# Patient Record
Sex: FEMALE | Race: BLACK OR AFRICAN AMERICAN | NOT HISPANIC OR LATINO | URBAN - METROPOLITAN AREA
[De-identification: names, ages, dates, MRNs, and addresses within clinical notes are randomized per-mention and may not be internally consistent; named-entity substitution may affect disease eponyms.]

---

## 2020-06-06 ENCOUNTER — EMERGENCY (EMERGENCY)
Facility: HOSPITAL | Age: 25
LOS: 0 days | Discharge: HOME | End: 2020-06-06
Attending: EMERGENCY MEDICINE
Payer: MEDICAID

## 2020-06-06 VITALS
OXYGEN SATURATION: 99 % | TEMPERATURE: 99 F | DIASTOLIC BLOOD PRESSURE: 72 MMHG | HEART RATE: 87 BPM | WEIGHT: 117.07 LBS | HEIGHT: 69 IN | RESPIRATION RATE: 16 BRPM | SYSTOLIC BLOOD PRESSURE: 117 MMHG

## 2020-06-06 DIAGNOSIS — Z88.0 ALLERGY STATUS TO PENICILLIN: ICD-10-CM

## 2020-06-06 DIAGNOSIS — R10.31 RIGHT LOWER QUADRANT PAIN: ICD-10-CM

## 2020-06-06 DIAGNOSIS — Z3A.14 14 WEEKS GESTATION OF PREGNANCY: ICD-10-CM

## 2020-06-06 DIAGNOSIS — O26.892 OTHER SPECIFIED PREGNANCY RELATED CONDITIONS, SECOND TRIMESTER: ICD-10-CM

## 2020-06-06 LAB
ALBUMIN SERPL ELPH-MCNC: 3.3 G/DL — SIGNIFICANT CHANGE UP (ref 3.3–5)
ALP SERPL-CCNC: 29 U/L — LOW (ref 40–120)
ALT FLD-CCNC: 13 U/L — SIGNIFICANT CHANGE UP (ref 12–78)
ANION GAP SERPL CALC-SCNC: 10 MMOL/L — SIGNIFICANT CHANGE UP (ref 5–17)
APPEARANCE UR: CLEAR — SIGNIFICANT CHANGE UP
AST SERPL-CCNC: 9 U/L — LOW (ref 15–37)
BACTERIA # UR AUTO: ABNORMAL
BASOPHILS # BLD AUTO: 0.02 K/UL — SIGNIFICANT CHANGE UP (ref 0–0.2)
BASOPHILS NFR BLD AUTO: 0.2 % — SIGNIFICANT CHANGE UP (ref 0–2)
BILIRUB SERPL-MCNC: 0.3 MG/DL — SIGNIFICANT CHANGE UP (ref 0.2–1.2)
BILIRUB UR-MCNC: NEGATIVE — SIGNIFICANT CHANGE UP
BUN SERPL-MCNC: 8 MG/DL — SIGNIFICANT CHANGE UP (ref 7–23)
CALCIUM SERPL-MCNC: 9.1 MG/DL — SIGNIFICANT CHANGE UP (ref 8.5–10.1)
CHLORIDE SERPL-SCNC: 104 MMOL/L — SIGNIFICANT CHANGE UP (ref 96–108)
CO2 SERPL-SCNC: 23 MMOL/L — SIGNIFICANT CHANGE UP (ref 22–31)
COLOR SPEC: YELLOW — SIGNIFICANT CHANGE UP
COMMENT - URINE: SIGNIFICANT CHANGE UP
CREAT SERPL-MCNC: 0.44 MG/DL — LOW (ref 0.5–1.3)
DIFF PNL FLD: NEGATIVE — SIGNIFICANT CHANGE UP
EOSINOPHIL # BLD AUTO: 0.04 K/UL — SIGNIFICANT CHANGE UP (ref 0–0.5)
EOSINOPHIL NFR BLD AUTO: 0.4 % — SIGNIFICANT CHANGE UP (ref 0–6)
EPI CELLS # UR: SIGNIFICANT CHANGE UP
GLUCOSE SERPL-MCNC: 83 MG/DL — SIGNIFICANT CHANGE UP (ref 70–99)
GLUCOSE UR QL: NEGATIVE MG/DL — SIGNIFICANT CHANGE UP
HCT VFR BLD CALC: 32.5 % — LOW (ref 34.5–45)
HGB BLD-MCNC: 11.2 G/DL — LOW (ref 11.5–15.5)
IMM GRANULOCYTES NFR BLD AUTO: 0.3 % — SIGNIFICANT CHANGE UP (ref 0–1.5)
KETONES UR-MCNC: NEGATIVE — SIGNIFICANT CHANGE UP
LEUKOCYTE ESTERASE UR-ACNC: NEGATIVE — SIGNIFICANT CHANGE UP
LYMPHOCYTES # BLD AUTO: 2.25 K/UL — SIGNIFICANT CHANGE UP (ref 1–3.3)
LYMPHOCYTES # BLD AUTO: 21 % — SIGNIFICANT CHANGE UP (ref 13–44)
MCHC RBC-ENTMCNC: 31.5 PG — SIGNIFICANT CHANGE UP (ref 27–34)
MCHC RBC-ENTMCNC: 34.5 GM/DL — SIGNIFICANT CHANGE UP (ref 32–36)
MCV RBC AUTO: 91.5 FL — SIGNIFICANT CHANGE UP (ref 80–100)
MONOCYTES # BLD AUTO: 1.03 K/UL — HIGH (ref 0–0.9)
MONOCYTES NFR BLD AUTO: 9.6 % — SIGNIFICANT CHANGE UP (ref 2–14)
NEUTROPHILS # BLD AUTO: 7.32 K/UL — SIGNIFICANT CHANGE UP (ref 1.8–7.4)
NEUTROPHILS NFR BLD AUTO: 68.5 % — SIGNIFICANT CHANGE UP (ref 43–77)
NITRITE UR-MCNC: NEGATIVE — SIGNIFICANT CHANGE UP
NRBC # BLD: 0 /100 WBCS — SIGNIFICANT CHANGE UP (ref 0–0)
PH UR: 8 — SIGNIFICANT CHANGE UP (ref 5–8)
PLATELET # BLD AUTO: 279 K/UL — SIGNIFICANT CHANGE UP (ref 150–400)
POTASSIUM SERPL-MCNC: 4.2 MMOL/L — SIGNIFICANT CHANGE UP (ref 3.5–5.3)
POTASSIUM SERPL-SCNC: 4.2 MMOL/L — SIGNIFICANT CHANGE UP (ref 3.5–5.3)
PROT SERPL-MCNC: 7.3 GM/DL — SIGNIFICANT CHANGE UP (ref 6–8.3)
PROT UR-MCNC: NEGATIVE MG/DL — SIGNIFICANT CHANGE UP
RBC # BLD: 3.55 M/UL — LOW (ref 3.8–5.2)
RBC # FLD: 12.8 % — SIGNIFICANT CHANGE UP (ref 10.3–14.5)
RBC CASTS # UR COMP ASSIST: SIGNIFICANT CHANGE UP /HPF (ref 0–4)
SODIUM SERPL-SCNC: 137 MMOL/L — SIGNIFICANT CHANGE UP (ref 135–145)
SP GR SPEC: 1.01 — SIGNIFICANT CHANGE UP (ref 1.01–1.02)
UROBILINOGEN FLD QL: NEGATIVE MG/DL — SIGNIFICANT CHANGE UP
WBC # BLD: 10.69 K/UL — HIGH (ref 3.8–10.5)
WBC # FLD AUTO: 10.69 K/UL — HIGH (ref 3.8–10.5)
WBC UR QL: SIGNIFICANT CHANGE UP

## 2020-06-06 PROCEDURE — 76805 OB US >/= 14 WKS SNGL FETUS: CPT | Mod: 26

## 2020-06-06 PROCEDURE — 74181 MRI ABDOMEN W/O CONTRAST: CPT | Mod: 26

## 2020-06-06 PROCEDURE — 99285 EMERGENCY DEPT VISIT HI MDM: CPT

## 2020-06-06 PROCEDURE — 76770 US EXAM ABDO BACK WALL COMP: CPT | Mod: 26

## 2020-06-06 RX ORDER — ACETAMINOPHEN 500 MG
650 TABLET ORAL ONCE
Refills: 0 | Status: COMPLETED | OUTPATIENT
Start: 2020-06-06 | End: 2020-06-06

## 2020-06-06 RX ORDER — SODIUM CHLORIDE 9 MG/ML
1000 INJECTION, SOLUTION INTRAVENOUS ONCE
Refills: 0 | Status: COMPLETED | OUTPATIENT
Start: 2020-06-06 | End: 2020-06-06

## 2020-06-06 RX ADMIN — SODIUM CHLORIDE 1000 MILLILITER(S): 9 INJECTION, SOLUTION INTRAVENOUS at 15:53

## 2020-06-06 RX ADMIN — Medication 650 MILLIGRAM(S): at 15:53

## 2020-06-06 NOTE — ED ADULT NURSE NOTE - CHIEF COMPLAINT QUOTE
as per pt " acute onset of weakness, headache &  lower right sided abdominal pain that radiates to the pelvic area starting last night. " hx 4 mos pregnant.  pt denies any vaginal bleeding, cramping or urinary symptoms.

## 2020-06-06 NOTE — ED PROVIDER NOTE - PATIENT PORTAL LINK FT
You can access the FollowMyHealth Patient Portal offered by [facility] by registering at the following website: [email or web address]. By joining [employer]’s FollowMyHealth portal, you will also be able to view your health information using other applications (apps) compatible with our system.

## 2020-06-06 NOTE — ED PROVIDER NOTE - PHYSICAL EXAMINATION
PHYSICAL EXAMINATION:  VITALS REVIEWED.  VS normal  GENERALIZED APPEARANCE:  Comfortable, no acute distress, ambulating without difficulty  SKIN:  Warm, dry, no cyanosis  HEAD:  No obvious scalp lesions  EYES:  Conjunctiva pink, no icterus  ENMT:  Mucus membranes moist, no stridor  NECK:  Supple, non-tender  CHEST AND RESPIRATORY:  Clear to auscultation B/L, good air entry B/L, equal chest expansion  HEART AND CARDIOVASCULAR:  Regular rate, no obvious murmur  ABDOMEN AND GI:  Soft, +RLQ tenderness, non-distended.  No rebound, no guarding, +R CVA tenderness   EXTREMITIES:  No deformity, edema, or calf tenderness  NEURO: AAOx3, gross motor and sensory intact

## 2020-06-06 NOTE — ED PROVIDER NOTE - CLINICAL SUMMARY MEDICAL DECISION MAKING FREE TEXT BOX
24F  w/ R flank pain, RLQ pain; r/o stone, r/o torsion, r/o SAB, r/o appendicitis, ?MSK pain; labs, imaging, monitoring, pain management, re-eval

## 2020-06-06 NOTE — ED PROVIDER NOTE - NS ED ROS FT
ROS:  GEN: (-) fevers/chills, (-) weight loss, (-) fatigue/malaise  HEENT: (-) visual change, (-) photophobia, (-) nasal congestion/rhinorrhea, (-) difficulty swallowing  NECK: (-) stiffness, (-) swelling  RESP: (-) shortness of breath, (-) cough, (-) sputum, (-) hemoptysis, (-) BRADLEY  CV: (-) chest pain, (-) palpitations, (-) PND/orthopnea  GI: (+) nausea, (-) vomiting, (+) pain, (-) constipation, (-) diarrhea, (-) melena, (-) BRBPR  : (-) frequency/urgency, (-) hematuria, (-) dysuria, (-) incontinence, (-) discharge  EXT: (-) edema, (-) cyanosis  ENDO: (-) heat/cold intolerance, (-) polyuria  NEURO: (-) paresthesias, (-) weakness, (-) headache, (-) dizziness, (-) syncope  MSK: no recent trauma, no muscle pain   SKIN: (-) rash

## 2020-06-06 NOTE — ED ADULT NURSE NOTE - NS PRO AD BILL OF RIGHTS
It looks like I cannot order a new sleep study without cancelling the existing order but the system does not let me do that. Please see if can cancel the sleep study order that exists so I can enter a new one. No

## 2020-06-06 NOTE — ED PROVIDER NOTE - NSFOLLOWUPINSTRUCTIONS_ED_ALL_ED_FT
Return to the emergency room if you experience worsening symptoms, difficulty breathing, fevers, n/v, chest pain, abd pain, or new concerning symptoms.    follow up with your obgyn doctor in 1-2 days.

## 2020-06-06 NOTE — ED PROVIDER NOTE - PROGRESS NOTE DETAILS
FRANCISCO MCGRATH: MRI negative for appy, discussed w/ pt, to be dc f/u pmd, return precautions given, patient is hemodynamically stable, mentating well, and ambulatory for dc. ABD: soft nd nt gravid uterus.

## 2020-06-06 NOTE — ED ADULT TRIAGE NOTE - CHIEF COMPLAINT QUOTE
as per pt " acute onset of weakness, headache &  lower right sided abdominal pain that radiates to the pelvic area starting last night. " pt denies any vaginal bleeding, cramping or urinary symptoms. as per pt " acute onset of weakness, headache &  lower right sided abdominal pain that radiates to the pelvic area starting last night. " hx 4 mos pregnant.  pt denies any vaginal bleeding, cramping or urinary symptoms.

## 2020-06-06 NOTE — ED PROVIDER NOTE - OBJECTIVE STATEMENT
24F  (EGA 14w3d by LMP ) w/ no PMHx w/ R flank pain radiating to the RLQ started last night, worsening, sharp/achy, exacerbated by movement, started when she rolled over on her water bottle, alleviated with rest and positional changes, denies any prior similar symptoms. Patient states that she has not taken anything for pain. Patient states that she has never had this before. Denies any trauma/injuries.

## 2020-06-07 LAB
CULTURE RESULTS: SIGNIFICANT CHANGE UP
SPECIMEN SOURCE: SIGNIFICANT CHANGE UP

## 2020-08-03 ENCOUNTER — EMERGENCY (EMERGENCY)
Facility: HOSPITAL | Age: 25
LOS: 0 days | Discharge: ANOTHER HOSPITAL | End: 2020-08-03
Attending: EMERGENCY MEDICINE
Payer: MEDICAID

## 2020-08-03 ENCOUNTER — OUTPATIENT (OUTPATIENT)
Dept: INPATIENT UNIT | Facility: HOSPITAL | Age: 25
LOS: 1 days | End: 2020-08-03
Payer: MEDICAID

## 2020-08-03 VITALS
OXYGEN SATURATION: 100 % | TEMPERATURE: 98 F | RESPIRATION RATE: 18 BRPM | SYSTOLIC BLOOD PRESSURE: 123 MMHG | HEART RATE: 85 BPM | DIASTOLIC BLOOD PRESSURE: 78 MMHG

## 2020-08-03 VITALS
HEART RATE: 93 BPM | SYSTOLIC BLOOD PRESSURE: 109 MMHG | RESPIRATION RATE: 16 BRPM | TEMPERATURE: 99 F | DIASTOLIC BLOOD PRESSURE: 72 MMHG

## 2020-08-03 VITALS
HEART RATE: 103 BPM | OXYGEN SATURATION: 100 % | DIASTOLIC BLOOD PRESSURE: 76 MMHG | RESPIRATION RATE: 21 BRPM | WEIGHT: 126.99 LBS | HEIGHT: 69 IN | TEMPERATURE: 99 F | SYSTOLIC BLOOD PRESSURE: 116 MMHG

## 2020-08-03 VITALS — DIASTOLIC BLOOD PRESSURE: 69 MMHG | SYSTOLIC BLOOD PRESSURE: 106 MMHG | HEART RATE: 88 BPM

## 2020-08-03 DIAGNOSIS — O46.92 ANTEPARTUM HEMORRHAGE, UNSPECIFIED, SECOND TRIMESTER: ICD-10-CM

## 2020-08-03 DIAGNOSIS — O26.899 OTHER SPECIFIED PREGNANCY RELATED CONDITIONS, UNSPECIFIED TRIMESTER: ICD-10-CM

## 2020-08-03 DIAGNOSIS — O60.00 PRETERM LABOR WITHOUT DELIVERY, UNSPECIFIED TRIMESTER: ICD-10-CM

## 2020-08-03 DIAGNOSIS — Z88.0 ALLERGY STATUS TO PENICILLIN: ICD-10-CM

## 2020-08-03 DIAGNOSIS — N93.9 ABNORMAL UTERINE AND VAGINAL BLEEDING, UNSPECIFIED: ICD-10-CM

## 2020-08-03 DIAGNOSIS — Z3A.00 WEEKS OF GESTATION OF PREGNANCY NOT SPECIFIED: ICD-10-CM

## 2020-08-03 LAB
ALBUMIN SERPL ELPH-MCNC: 2.9 G/DL — LOW (ref 3.3–5)
ALP SERPL-CCNC: 41 U/L — SIGNIFICANT CHANGE UP (ref 40–120)
ALT FLD-CCNC: 23 U/L — SIGNIFICANT CHANGE UP (ref 12–78)
ANION GAP SERPL CALC-SCNC: 6 MMOL/L — SIGNIFICANT CHANGE UP (ref 5–17)
APPEARANCE UR: CLEAR — SIGNIFICANT CHANGE UP
AST SERPL-CCNC: 14 U/L — LOW (ref 15–37)
BACTERIA # UR AUTO: ABNORMAL
BASOPHILS # BLD AUTO: 0.02 K/UL — SIGNIFICANT CHANGE UP (ref 0–0.2)
BASOPHILS NFR BLD AUTO: 0.2 % — SIGNIFICANT CHANGE UP (ref 0–2)
BILIRUB SERPL-MCNC: 0.3 MG/DL — SIGNIFICANT CHANGE UP (ref 0.2–1.2)
BILIRUB UR-MCNC: NEGATIVE — SIGNIFICANT CHANGE UP
BLD GP AB SCN SERPL QL: SIGNIFICANT CHANGE UP
BUN SERPL-MCNC: 9 MG/DL — SIGNIFICANT CHANGE UP (ref 7–23)
CALCIUM SERPL-MCNC: 8.2 MG/DL — LOW (ref 8.5–10.1)
CHLORIDE SERPL-SCNC: 106 MMOL/L — SIGNIFICANT CHANGE UP (ref 96–108)
CO2 SERPL-SCNC: 23 MMOL/L — SIGNIFICANT CHANGE UP (ref 22–31)
COLOR SPEC: YELLOW — SIGNIFICANT CHANGE UP
CREAT SERPL-MCNC: 0.59 MG/DL — SIGNIFICANT CHANGE UP (ref 0.5–1.3)
DIFF PNL FLD: NEGATIVE — SIGNIFICANT CHANGE UP
EOSINOPHIL # BLD AUTO: 0.09 K/UL — SIGNIFICANT CHANGE UP (ref 0–0.5)
EOSINOPHIL NFR BLD AUTO: 0.7 % — SIGNIFICANT CHANGE UP (ref 0–6)
GLUCOSE SERPL-MCNC: 75 MG/DL — SIGNIFICANT CHANGE UP (ref 70–99)
GLUCOSE UR QL: NEGATIVE MG/DL — SIGNIFICANT CHANGE UP
HCG SERPL-ACNC: HIGH MIU/ML
HCT VFR BLD CALC: 31 % — LOW (ref 34.5–45)
HGB BLD-MCNC: 10.6 G/DL — LOW (ref 11.5–15.5)
IMM GRANULOCYTES NFR BLD AUTO: 0.3 % — SIGNIFICANT CHANGE UP (ref 0–1.5)
KETONES UR-MCNC: NEGATIVE — SIGNIFICANT CHANGE UP
LEUKOCYTE ESTERASE UR-ACNC: ABNORMAL
LYMPHOCYTES # BLD AUTO: 19.8 % — SIGNIFICANT CHANGE UP (ref 13–44)
LYMPHOCYTES # BLD AUTO: 2.41 K/UL — SIGNIFICANT CHANGE UP (ref 1–3.3)
MAGNESIUM SERPL-MCNC: 2 MG/DL — SIGNIFICANT CHANGE UP (ref 1.6–2.6)
MCHC RBC-ENTMCNC: 32 PG — SIGNIFICANT CHANGE UP (ref 27–34)
MCHC RBC-ENTMCNC: 34.2 GM/DL — SIGNIFICANT CHANGE UP (ref 32–36)
MCV RBC AUTO: 93.7 FL — SIGNIFICANT CHANGE UP (ref 80–100)
MONOCYTES # BLD AUTO: 1.15 K/UL — HIGH (ref 0–0.9)
MONOCYTES NFR BLD AUTO: 9.5 % — SIGNIFICANT CHANGE UP (ref 2–14)
NEUTROPHILS # BLD AUTO: 8.45 K/UL — HIGH (ref 1.8–7.4)
NEUTROPHILS NFR BLD AUTO: 69.5 % — SIGNIFICANT CHANGE UP (ref 43–77)
NITRITE UR-MCNC: NEGATIVE — SIGNIFICANT CHANGE UP
NRBC # BLD: 0 /100 WBCS — SIGNIFICANT CHANGE UP (ref 0–0)
PH UR: 6.5 — SIGNIFICANT CHANGE UP (ref 5–8)
PLATELET # BLD AUTO: 273 K/UL — SIGNIFICANT CHANGE UP (ref 150–400)
POTASSIUM SERPL-MCNC: 3.7 MMOL/L — SIGNIFICANT CHANGE UP (ref 3.5–5.3)
POTASSIUM SERPL-SCNC: 3.7 MMOL/L — SIGNIFICANT CHANGE UP (ref 3.5–5.3)
PROT SERPL-MCNC: 6.9 GM/DL — SIGNIFICANT CHANGE UP (ref 6–8.3)
PROT UR-MCNC: NEGATIVE MG/DL — SIGNIFICANT CHANGE UP
RBC # BLD: 3.31 M/UL — LOW (ref 3.8–5.2)
RBC # FLD: 13.4 % — SIGNIFICANT CHANGE UP (ref 10.3–14.5)
RBC CASTS # UR COMP ASSIST: SIGNIFICANT CHANGE UP /HPF (ref 0–4)
SODIUM SERPL-SCNC: 135 MMOL/L — SIGNIFICANT CHANGE UP (ref 135–145)
SP GR SPEC: 1.01 — SIGNIFICANT CHANGE UP (ref 1.01–1.02)
UROBILINOGEN FLD QL: NEGATIVE MG/DL — SIGNIFICANT CHANGE UP
WBC # BLD: 12.16 K/UL — HIGH (ref 3.8–10.5)
WBC # FLD AUTO: 12.16 K/UL — HIGH (ref 3.8–10.5)
WBC UR QL: SIGNIFICANT CHANGE UP

## 2020-08-03 PROCEDURE — 76856 US EXAM PELVIC COMPLETE: CPT | Mod: 26

## 2020-08-03 PROCEDURE — 99285 EMERGENCY DEPT VISIT HI MDM: CPT

## 2020-08-03 PROCEDURE — 99203 OFFICE O/P NEW LOW 30 MIN: CPT

## 2020-08-03 PROCEDURE — 76830 TRANSVAGINAL US NON-OB: CPT | Mod: 26

## 2020-08-03 PROCEDURE — 76818 FETAL BIOPHYS PROFILE W/NST: CPT | Mod: 26

## 2020-08-03 RX ORDER — SODIUM CHLORIDE 9 MG/ML
1000 INJECTION INTRAMUSCULAR; INTRAVENOUS; SUBCUTANEOUS ONCE
Refills: 0 | Status: COMPLETED | OUTPATIENT
Start: 2020-08-03 | End: 2020-08-03

## 2020-08-03 RX ADMIN — SODIUM CHLORIDE 1000 MILLILITER(S): 9 INJECTION INTRAMUSCULAR; INTRAVENOUS; SUBCUTANEOUS at 17:16

## 2020-08-03 RX ADMIN — SODIUM CHLORIDE 1000 MILLILITER(S): 9 INJECTION INTRAMUSCULAR; INTRAVENOUS; SUBCUTANEOUS at 19:19

## 2020-08-03 NOTE — OB PROVIDER TRIAGE NOTE - NSOBPROVIDERNOTE_OBGYN_ALL_OB_FT
No evidence of vaginal bleeding. No evidence of pre-term labor. Discussed with Dr. Manley/Dr. Kearney:  -Patient cleared for discharge home  -PLT precautions reviewed  -Patient instructed to follow up with next scheduled appointment 8/8/2020  -Verbal and written discharge instructions given , patient verbalized understanding

## 2020-08-03 NOTE — OB PROVIDER TRIAGE NOTE - HISTORY OF PRESENT ILLNESS
Default patient 26 y/o  @22.3 weeks gestation presents to triage as a transfer from [place]. Patient states she presented to [place] with complaints of very light vaginal spotting, pink tinged and cramping x1 hour. Patient states she was told she was diagnosed with placenta previa 3 weeks ago during her anatomy scan.  Patient denies leaking of fluid and reports positive fetal movement. Patient receives prenatal care with Dr. Krys Clark   Current Ap course significant for: Placenta Previa  Surgical H/x: Denies  Ob/Gyn H/x: Denies  Psych H/x: Denies  Meds: Pnv  NKDA Default patient 26 y/o  @22.3 weeks gestation presents to triage as a transfer from [place]. Patient states she presented to [place] with complaints of very light vaginal spotting, pink tinged and cramping x1 hour. Patient states she was told she was diagnosed with placenta previa 3 weeks ago during her anatomy scan.  Patient denies leaking of fluid and reports positive fetal movement. Patient receives prenatal care with Dr. Krys Clark.   Current Ap course significant for: Placenta Previa  Surgical H/x: Denies  Ob/Gyn H/x: Denies  Psych H/x: Denies  Meds: Pnv  NKDA

## 2020-08-03 NOTE — ED ADULT NURSE NOTE - OBJECTIVE STATEMENT
Pt walked in c/o abdominal cramp and spotting 21 weeks pregnancy denies any medical hx. light spotting beginign this am 1 clot. pt was told to come in by obgyn. first pregancy

## 2020-08-03 NOTE — ED PROVIDER NOTE - CLINICAL SUMMARY MEDICAL DECISION MAKING FREE TEXT BOX
vaginal bleeding. hx of low lying placenta vaginal bleeding. hx of low lying placenta. ob is darwin jhaveri vaginal bleeding. hx of low lying placenta. ob is darwin jhaveri. case dw dr melgar at [facility]. will xfer for further eval.  RH pending...

## 2020-08-03 NOTE — ED PROVIDER NOTE - PHYSICAL EXAMINATION
Gen: Alert, NAD  Head: NC, AT   Eyes: PERRL, EOMI, normal lids/conjunctiva  ENT: normal hearing, patent oropharynx without erythema/exudate, uvula midline  Neck: supple, no tenderness, Trachea midline  Pulm: Bilateral BS, normal resp effort, no wheeze/stridor/retractions  CV: RRR, no M/R/G, 2+ radial and dp pulses bl, no edema  Abd: gravid, non tender   Mskel: extremities x4 with normal ROM and no joint effusions. no ctl spine ttp.   Skin: no rash, no bruising   Neuro: AAOx3, no sensory/motor deficits, CN 2-12 intact

## 2020-08-03 NOTE — ED PROVIDER NOTE - OBJECTIVE STATEMENT
25F hx g1 at 21 weeks pw vaginal spotting x2 days. was told by obgyn to present to er if she has bleeding because she is known to have placenta previa. she also notes regular lower abdominal pain into the pelvic and vagina. no vaginal discharge. no nausea, vomiting, fever, chills, rash. ros neg for ha, vision loss, rhinorrhea, cp, sob, rash, numbness

## 2020-08-03 NOTE — OB PROVIDER TRIAGE NOTE - NSHPPHYSICALEXAM_GEN_ALL_CORE
Bp: 109/72  Hr: 93  T: 37.3  Abdomen: Gravid, soft, non-tender on palpation   TAUS: Anterior placenta, Low lying, MVP: 6.30cm. FHR: 139bpm  SSE: No blood in vault, cervix appears closed  TVUS Bp: 109/72  Hr: 93  T: 37.3  Abdomen: Gravid, soft, non-tender on palpation   TAUS: Anterior placenta, Low lying, MVP: 6.30cm. FHR: 139bpm  SSE: No blood in vault, cervix appears closed  TVUS: 3.28-3.49cm, no funneling, no dynamic changes  [place]:  No contractions on toco

## 2020-08-03 NOTE — OB PROVIDER TRIAGE NOTE - ADDITIONAL INSTRUCTIONS
-Patient cleared for discharge home  -PLT precautions reviewed  -Patient instructed to follow up with next scheduled appointment 8/8/2020  -Verbal and written discharge instructions given , patient verbalized understanding

## 2020-08-03 NOTE — OB PROVIDER TRIAGE NOTE - NSHPLABSRESULTS_GEN_ALL_CORE
Blood Type: O Positive  Urinalysis Basic - ( 03 Aug 2020 17:52 )    Color: Yellow / Appearance: Clear / S.010 / pH: x  Gluc: x / Ketone: Negative  / Bili: Negative / Urobili: Negative mg/dL   Blood: x / Protein: Negative mg/dL / Nitrite: Negative   Leuk Esterase: Trace / RBC: 0-2 /HPF / WBC 0-2   Sq Epi: x / Non Sq Epi: x / Bacteria: Occasional

## 2020-08-03 NOTE — ED ADULT NURSE NOTE - ED STAT RN HANDOFF DETAILS
report taken from GEORGE hammer. no acute distress noted, will continue to monitor. pt denies pain, denies SOB, denies n/v/d, denies chest pain, denies vaginal bleeding at this time. no complaints at this time. pending transfer for further eval. report taken from GEORGE hammer. no acute distress noted, will continue to monitor. pt denies pain, denies SOB, denies n/v/d, denies chest pain, denies vaginal bleeding at this time. no complaints at this time. covid sent. pending transfer for further eval.

## 2020-08-03 NOTE — ED ADULT NURSE NOTE - PAIN RATING/NUMBER SCALE (0-10): ACTIVITY
Writer called Richy today and see how he was doing s/p liver lesion biopsy and discuss scheduled consult with Dr. Fink to be aligned with biopsy results. Discussed with Watson keeping 1/9 appointment at this time however if biopsy results are available sooner than we expect (writer will monitor) we can call and attempt to have him be seen sooner. Watson verbalized understanding and states if he runs out of pain medication, he will all PCP for refill. Encouraged Watson to all if any additional needs arise. Support and encouragement provided.    4

## 2020-08-04 PROBLEM — Z78.9 OTHER SPECIFIED HEALTH STATUS: Chronic | Status: ACTIVE | Noted: 2020-06-06

## 2020-08-04 LAB — SARS-COV-2 RNA SPEC QL NAA+PROBE: SIGNIFICANT CHANGE UP

## 2020-09-04 ENCOUNTER — EMERGENCY (EMERGENCY)
Facility: HOSPITAL | Age: 25
LOS: 1 days | Discharge: STILL ADMITTED OR ADMINISTRATIVE | End: 2020-09-04
Admitting: EMERGENCY MEDICINE
Payer: MEDICAID

## 2020-09-04 ENCOUNTER — OUTPATIENT (OUTPATIENT)
Dept: INPATIENT UNIT | Facility: HOSPITAL | Age: 25
LOS: 1 days | Discharge: HOME | End: 2020-09-04
Payer: MEDICAID

## 2020-09-04 VITALS — HEART RATE: 82 BPM | SYSTOLIC BLOOD PRESSURE: 94 MMHG | DIASTOLIC BLOOD PRESSURE: 52 MMHG

## 2020-09-04 VITALS — DIASTOLIC BLOOD PRESSURE: 70 MMHG | SYSTOLIC BLOOD PRESSURE: 113 MMHG | HEART RATE: 111 BPM

## 2020-09-04 DIAGNOSIS — O26.899 OTHER SPECIFIED PREGNANCY RELATED CONDITIONS, UNSPECIFIED TRIMESTER: ICD-10-CM

## 2020-09-04 DIAGNOSIS — Z3A.00 WEEKS OF GESTATION OF PREGNANCY NOT SPECIFIED: ICD-10-CM

## 2020-09-04 LAB
APPEARANCE UR: SIGNIFICANT CHANGE UP
BACTERIA # UR AUTO: HIGH
BILIRUB UR-MCNC: NEGATIVE — SIGNIFICANT CHANGE UP
BLOOD UR QL VISUAL: NEGATIVE — SIGNIFICANT CHANGE UP
COLOR SPEC: COLORLESS — SIGNIFICANT CHANGE UP
GLUCOSE UR-MCNC: NEGATIVE — SIGNIFICANT CHANGE UP
KETONES UR-MCNC: NEGATIVE — SIGNIFICANT CHANGE UP
LEUKOCYTE ESTERASE UR-ACNC: SIGNIFICANT CHANGE UP
NITRITE UR-MCNC: NEGATIVE — SIGNIFICANT CHANGE UP
PH UR: 7 — SIGNIFICANT CHANGE UP (ref 5–8)
PROT UR-MCNC: NEGATIVE — SIGNIFICANT CHANGE UP
RBC CASTS # UR COMP ASSIST: SIGNIFICANT CHANGE UP (ref 0–?)
SP GR SPEC: 1.01 — SIGNIFICANT CHANGE UP (ref 1–1.04)
SQUAMOUS # UR AUTO: SIGNIFICANT CHANGE UP
UROBILINOGEN FLD QL: NORMAL — SIGNIFICANT CHANGE UP
WBC UR QL: HIGH (ref 0–?)

## 2020-09-04 PROCEDURE — 76816 OB US FOLLOW-UP PER FETUS: CPT | Mod: 26

## 2020-09-04 PROCEDURE — 76817 TRANSVAGINAL US OBSTETRIC: CPT | Mod: 26

## 2020-09-04 PROCEDURE — 99214 OFFICE O/P EST MOD 30 MIN: CPT | Mod: 25

## 2020-09-04 PROCEDURE — 99282 EMERGENCY DEPT VISIT SF MDM: CPT

## 2020-09-04 PROCEDURE — 59025 FETAL NON-STRESS TEST: CPT | Mod: 26

## 2020-09-04 NOTE — OB PROVIDER TRIAGE NOTE - NSHPPHYSICALEXAM_GEN_ALL_CORE
speculum exam - negative pool/dye/fern  TVUS - CL3.5-3.7cm, no funneling or dynamic changes noted  TAS - cephalic presentation, anterior placenta, mvp 4.3cm, bpp 8/8, efw 1088 grams  abdomen soft and nontender    Vital Signs Last 24 Hrs  T(C): 37 (04 Sep 2020 13:58), Max: 37 (04 Sep 2020 13:58)  T(F): 98.6 (04 Sep 2020 13:58), Max: 98.6 (04 Sep 2020 13:58)  HR: 108 (04 Sep 2020 14:03) (102 - 111)  BP: 113/70 (04 Sep 2020 13:58) (113/70 - 113/70)  BP(mean): --  RR: 16 (04 Sep 2020 13:58) (16 - 16)  SpO2: 100% (04 Sep 2020 14:03) (100% - 100%) speculum exam - negative pool/dye/fern  TVUS - CL3.5-3.7cm, no funneling or dynamic changes noted  TAS - cephalic presentation, anterior placenta, mvp 4.3cm, bpp 8/8, efw 1088 grams  abdomen soft and nontender    Vital Signs Last 24 Hrs  T(C): 37 (04 Sep 2020 13:58), Max: 37 (04 Sep 2020 13:58)  T(F): 98.6 (04 Sep 2020 13:58), Max: 98.6 (04 Sep 2020 13:58)  HR: 108 (04 Sep 2020 14:03) (102 - 111)  BP: 113/70 (04 Sep 2020 13:58) (113/70 - 113/70)  BP(mean): --  RR: 16 (04 Sep 2020 13:58) (16 - 16)  SpO2: 100% (04 Sep 2020 14:03) (100% - 100%)    Cat 1 fhr tracing, fhr 145 with mod variability, accels, no decels  acontractile on toco

## 2020-09-04 NOTE — ED ADULT TRIAGE NOTE - CHIEF COMPLAINT QUOTE
Pt brought in by ambulance as a notification, for imminent childbirth as per EMS pt is 27 wks w/ contractions every 1.5min and "her water broke." Taken to trauma B.

## 2020-09-04 NOTE — ED PROVIDER NOTE - CLINICAL SUMMARY MEDICAL DECISION MAKING FREE TEXT BOX
24 y/o G1PO F @27 weeks gestation (single baby) with no PMH presenting due to concern for labor and imminent deliver. 45 minutes ago with gush of fluid and contractions ~1-1.5 min. Vital signs stable. No bleeding or fetal parts palpated on pelvic exam. Will bring patient to L&D triage for further evaluation and management. Case discussed w/ OB resident on the way to L&D.

## 2020-09-04 NOTE — ED ADULT NURSE NOTE - OBJECTIVE STATEMENT
pt BIBEMS first pregnancy 27 weeks felt a "rush" of fluid earlier this AM and is now experiencing contractions every 1.5 minutes. pt was examined in Tr B by Dr Steel and cleared to go to L&D. Dr Ca spoke with OBMARALN. pt sent to L&D.

## 2020-09-04 NOTE — ED PROVIDER NOTE - OBJECTIVE STATEMENT
24 y/o G1PO F @27 weeks gestation with no PMH, OB hx of low lying placenta but "moved up" on repeat US, no other pregnancy complications noted, presenting due to concern for labor and imminent delivery. Pt reports 45 minutes ago had gush of clear fluid with pelvic pain/contractions every ~1-1.5 minutes. No bleeding. 1 baby in uterus per mom. No fevers/chills, chest pain, SOB, n/v, leg swelling, HA, vision changes, or other complaints at this time.

## 2020-09-04 NOTE — OB PROVIDER TRIAGE NOTE - HISTORY OF PRESENT ILLNESS
27 y.o.  @ 27 weeks reports lof @ 12pm and shooting pain in her lower abdomen into her vaginal/rectal area. Pt reports good fetal movement. Denies vb. Pt sees Krys Nicolas at [place] for prenatal care.

## 2020-09-04 NOTE — ED PROVIDER NOTE - ATTENDING CONTRIBUTION TO CARE
Dr. Steel: 24 yo female approx 27 weeks pregnant with first child, in ED with cramping abdominal pain beginning 45 minutes after gush of fluid from vagina, with concern for possible labor.  Pt evaluated in ED, no signed of imminent delivery and so pt stable for transfer to L&D.

## 2020-09-04 NOTE — OB PROVIDER TRIAGE NOTE - NSHPLABSRESULTS_GEN_ALL_CORE
Urinalysis Basic - ( 04 Sep 2020 14:11 )    Color: COLORLESS / Appearance: Lt TURBID / S.006 / pH: 7.0  Gluc: NEGATIVE / Ketone: NEGATIVE  / Bili: NEGATIVE / Urobili: NORMAL   Blood: NEGATIVE / Protein: NEGATIVE / Nitrite: NEGATIVE   Leuk Esterase: SMALL / RBC: x / WBC x   Sq Epi: x / Non Sq Epi: x / Bacteria: x

## 2020-09-04 NOTE — OB PROVIDER TRIAGE NOTE - NSOBPROVIDERNOTE_OBGYN_ALL_OB_FT
UA pending UA pending    d/w Dr. David  pt discharged home - no evidence of pprom or ptl  ptl precautions reviewed  f/u with next scheduled appointment  daily kick counts reviewed

## 2020-09-04 NOTE — ED PROVIDER NOTE - PHYSICAL EXAMINATION
PHYSICAL EXAM:  GENERAL: Alert, appears uncomfortable 2/2 contractions but in no acute distress  HENMT: Atraumatic, moist mucous membranes  EYES: Clear bilaterally, PERRL, EOMs intact b/l  HEART: RRR, S1/S2, no murmurs noted  RESPIRATORY: Clear to auscultation bilaterally, no wheezes/rhonchi/rales  ABDOMEN: gravid abdomen, +BS, soft, nontender  : Small amount of mucous present at vaginal introitus, no bleeding, unable to palpate cervix, no fetal head or fetal parts palpated in vaginal canal  EXTREMITIES: No lower extremity edema, +2 radial pulses b/l  NEURO:  A&Ox4, no focal motor deficits or sensory deficits   SKIN:  Skin normal color for race, warm, dry and intact. No evidence of rash. PHYSICAL EXAM:  GENERAL: Alert, appears uncomfortable 2/2 ?contractions but in no acute distress  HENMT: Atraumatic, moist mucous membranes  ABDOMEN: gravid abdomen, +BS, soft, nontender  : Small amount of mucous present at vaginal introitus, no bleeding, unable to palpate cervix, no fetal head or fetal parts palpated in vaginal canal  EXTREMITIES: No lower extremity edema, +2 radial pulses b/l

## 2020-10-17 ENCOUNTER — OUTPATIENT (OUTPATIENT)
Dept: INPATIENT UNIT | Facility: HOSPITAL | Age: 25
LOS: 1 days | Discharge: HOME | End: 2020-10-17
Payer: MEDICAID

## 2020-10-17 VITALS
HEART RATE: 100 BPM | SYSTOLIC BLOOD PRESSURE: 114 MMHG | TEMPERATURE: 98 F | DIASTOLIC BLOOD PRESSURE: 68 MMHG | RESPIRATION RATE: 16 BRPM

## 2020-10-17 VITALS — SYSTOLIC BLOOD PRESSURE: 114 MMHG | DIASTOLIC BLOOD PRESSURE: 68 MMHG | HEART RATE: 100 BPM

## 2020-10-17 DIAGNOSIS — O26.899 OTHER SPECIFIED PREGNANCY RELATED CONDITIONS, UNSPECIFIED TRIMESTER: ICD-10-CM

## 2020-10-17 DIAGNOSIS — Z3A.00 WEEKS OF GESTATION OF PREGNANCY NOT SPECIFIED: ICD-10-CM

## 2020-10-17 LAB
APPEARANCE UR: SIGNIFICANT CHANGE UP
BACTERIA # UR AUTO: HIGH
BILIRUB UR-MCNC: NEGATIVE — SIGNIFICANT CHANGE UP
BLOOD UR QL VISUAL: NEGATIVE — SIGNIFICANT CHANGE UP
COLOR SPEC: SIGNIFICANT CHANGE UP
GLUCOSE UR-MCNC: NEGATIVE — SIGNIFICANT CHANGE UP
HYALINE CASTS # UR AUTO: SIGNIFICANT CHANGE UP
KETONES UR-MCNC: NEGATIVE — SIGNIFICANT CHANGE UP
LEUKOCYTE ESTERASE UR-ACNC: SIGNIFICANT CHANGE UP
NITRITE UR-MCNC: NEGATIVE — SIGNIFICANT CHANGE UP
PH UR: 7.5 — SIGNIFICANT CHANGE UP (ref 5–8)
PROT UR-MCNC: 20 — SIGNIFICANT CHANGE UP
RBC CASTS # UR COMP ASSIST: SIGNIFICANT CHANGE UP (ref 0–?)
SP GR SPEC: 1.01 — SIGNIFICANT CHANGE UP (ref 1–1.04)
SQUAMOUS # UR AUTO: SIGNIFICANT CHANGE UP
UROBILINOGEN FLD QL: NORMAL — SIGNIFICANT CHANGE UP
WBC UR QL: >50 — HIGH (ref 0–?)

## 2020-10-17 PROCEDURE — 99215 OFFICE O/P EST HI 40 MIN: CPT | Mod: 25

## 2020-10-17 PROCEDURE — 76818 FETAL BIOPHYS PROFILE W/NST: CPT | Mod: 26

## 2020-10-17 PROCEDURE — 76817 TRANSVAGINAL US OBSTETRIC: CPT | Mod: 26

## 2020-10-17 RX ORDER — NITROFURANTOIN MACROCRYSTAL 50 MG
1 CAPSULE ORAL
Qty: 14 | Refills: 0
Start: 2020-10-17 | End: 2020-10-23

## 2020-10-17 NOTE — OB PROVIDER TRIAGE NOTE - NSHPPHYSICALEXAM_GEN_ALL_CORE
Gen: A&O x 3; NAD  Vitals: BP-114/68; P-100; P-100; T-36.8    Pulm-CTA B/L; no wheezes  Cor-clear S1S2; no murmurs  Abd exam-soft and nontender; CVA tenderness is neg    SSE-os appears closed. GBS and FFN collected  VE-0.5/60/-2    TVS-1.2-1.57cm; no funnel/dynamic change    NST cat I with accels and mod variability; no ctx's    TAS-vtx Gen: A&O x 3; NAD  Vitals: BP-114/68; P-100; P-100; T-36.8    Pulm-CTA B/L; no wheezes  Cor-clear S1S2; no murmurs  Abd exam-soft and nontender; CVA tenderness is neg    SSE-os appears closed. GBS and FFN collected  VE-0.5/60/-2    TVS-1.2-1.57cm; no funnel/dynamic change    NST cat I with accels and mod variability; no ctx's    TAS-vtx; anterior placeta; THO-14.28; 1959; 8/8 BPP

## 2020-10-17 NOTE — OB RN TRIAGE NOTE - CHIEF COMPLAINT QUOTE
pain x24hrs in lower back and painful urination pain x24hrs in lower back radiating to lower abdomen and painful urination

## 2020-10-17 NOTE — OB PROVIDER TRIAGE NOTE - NSOBPROVIDERNOTE_OBGYN_ALL_OB_FT
26yo -American female  @ 33.3 wks SLIUP uncomp PNC here complaining of lower back pain that radiates around to the front groin and down the front of her thighs Q20 min since last night  -TVS @ 1.2-1.5cm, no ctx's on toco  -pt was dw Dr Gutpa; no FFN needed to be sent since no ctx's on toco  -pt with reassuring maternal-fetal status; awaiting UA  -pt was trent Gupta  -pt reports follow-up appt on Tue 10/20 24yo -American female  @ 33.3 wks SLIUP uncomp PNC here complaining of lower back pain that radiates around to the front groin and down the front of her thighs Q20 min since last night  -TVS @ 1.2-1.5cm, no ctx's on toco  -pt was dw Dr Gupta; no FFN needed to be sent since no ctx's on toco  -pt with reassuring maternal-fetal status; awaiting UA  -pt was trent Gupta. Pt with probable UTI. Pt was dc home with a rx for macrobid 100mg BID. Ucx sent  -pt reports follow-up appt on Tue 10/20

## 2020-10-17 NOTE — OB PROVIDER TRIAGE NOTE - HISTORY OF PRESENT ILLNESS
24yo  female  @ 33.3 wks SLIUP uncomp PNC here complaining of lower back pain that radiates to the front into the groin and down the front of the thighs Q20 min. Pt reports a one time trickle of fluid just now when she was registering in. Pt reports GFM and denies VB. No urinary sx's. Pt reports she gets PNC with the [facility] in [place].     Pmhx-denies  Pshx/Hosp-denies  Meds-PNV  Allergies-PCN->hives  Past ob-denies  Gyn-denies

## 2020-10-18 LAB
CULTURE RESULTS: SIGNIFICANT CHANGE UP
SPECIMEN SOURCE: SIGNIFICANT CHANGE UP

## 2020-10-28 ENCOUNTER — OUTPATIENT (OUTPATIENT)
Dept: INPATIENT UNIT | Facility: HOSPITAL | Age: 25
LOS: 1 days | Discharge: HOME | End: 2020-10-28

## 2020-10-28 VITALS
SYSTOLIC BLOOD PRESSURE: 125 MMHG | HEART RATE: 102 BPM | DIASTOLIC BLOOD PRESSURE: 71 MMHG | TEMPERATURE: 99 F | RESPIRATION RATE: 16 BRPM

## 2020-10-28 VITALS — SYSTOLIC BLOOD PRESSURE: 106 MMHG | DIASTOLIC BLOOD PRESSURE: 61 MMHG | HEART RATE: 100 BPM

## 2020-10-28 DIAGNOSIS — O26.899 OTHER SPECIFIED PREGNANCY RELATED CONDITIONS, UNSPECIFIED TRIMESTER: ICD-10-CM

## 2020-10-28 DIAGNOSIS — Z3A.00 WEEKS OF GESTATION OF PREGNANCY NOT SPECIFIED: ICD-10-CM

## 2020-10-28 LAB
APPEARANCE UR: SIGNIFICANT CHANGE UP
BACTERIA # UR AUTO: HIGH
BILIRUB UR-MCNC: NEGATIVE — SIGNIFICANT CHANGE UP
BLOOD UR QL VISUAL: NEGATIVE — SIGNIFICANT CHANGE UP
COLOR SPEC: SIGNIFICANT CHANGE UP
GLUCOSE UR-MCNC: NEGATIVE — SIGNIFICANT CHANGE UP
HYALINE CASTS # UR AUTO: SIGNIFICANT CHANGE UP
KETONES UR-MCNC: SIGNIFICANT CHANGE UP
LEUKOCYTE ESTERASE UR-ACNC: SIGNIFICANT CHANGE UP
MUCOUS THREADS # UR AUTO: SIGNIFICANT CHANGE UP
NITRITE UR-MCNC: NEGATIVE — SIGNIFICANT CHANGE UP
PH UR: 7 — SIGNIFICANT CHANGE UP (ref 5–8)
PROT UR-MCNC: NEGATIVE — SIGNIFICANT CHANGE UP
RBC CASTS # UR COMP ASSIST: SIGNIFICANT CHANGE UP (ref 0–?)
SP GR SPEC: 1.01 — SIGNIFICANT CHANGE UP (ref 1–1.04)
SQUAMOUS # UR AUTO: SIGNIFICANT CHANGE UP
UROBILINOGEN FLD QL: NORMAL — SIGNIFICANT CHANGE UP
WBC UR QL: HIGH (ref 0–?)

## 2020-10-28 RX ORDER — NITROFURANTOIN MACROCRYSTAL 50 MG
1 CAPSULE ORAL
Qty: 14 | Refills: 0
Start: 2020-10-28 | End: 2020-11-03

## 2020-10-28 RX ORDER — ACETAMINOPHEN 500 MG
975 TABLET ORAL ONCE
Refills: 0 | Status: COMPLETED | OUTPATIENT
Start: 2020-10-28 | End: 2020-10-28

## 2020-10-28 RX ADMIN — Medication 975 MILLIGRAM(S): at 07:20

## 2020-10-28 NOTE — OB RN TRIAGE NOTE - SUICIDE SCREENING QUESTION 1
Patient has a two week history of abdominal pain, loose stools, sharp hooting pain right side of lower abdomen. The pain is worsening. She will also have blood in the stool. She has a hx of IBS. she is under the care of GI. She also has indigestion.  She wa
No

## 2020-10-28 NOTE — OB PROVIDER TRIAGE NOTE - NSHPPHYSICALEXAM_GEN_ALL_CORE
Assessment reveals VSS, abdomen soft, NT, gravid.   No CVA tenderness  BPP 8/8, THO 14.8, EFW 2226g, vtx, anterior placenta appears intact.  SSE- no VB in vaginal vault. Cervix appears closed  VE 1/80/-3    UA sent   GBS culture sent Assessment reveals VSS, abdomen soft, NT, gravid.   No CVA tenderness  BPP 8/8, THO 14.8, EFW 2226g, vtx, anterior placenta appears intact.  SSE- no VB in vaginal vault. Cervix appears closed  VE 1/80/-3    UA sent   GBS culture sent  Tylenol 975mg PO given for back pain Assessment reveals VSS, abdomen soft, NT, gravid.   No CVA tenderness  BPP 8/8, THO 14.8, EFW 2226g, vtx, anterior placenta appears intact.  SSE- no VB in vaginal vault. Cervix appears closed  VE 1/80/-3    UA sent   GBS culture sent  Tylenol 975mg PO given for back pain    0940: Patient reports feeling leaking of fluid from 20 minutes ago  White leukherrea noted between legs   SSE- neg pooling, nitrizine equivocal, neg fern  VE 1/80/-3- unchanged  Denies relief of symptoms from tylenol  Cat 1 FHT, ctx 2-5 on toco    UA with small ketones  large leuks  WBC 26-50  Bacteria- many Assessment reveals VSS, abdomen soft, NT, gravid.   No CVA tenderness  BPP 8/8, THO 14.8, EFW 2226g, vtx, anterior placenta appears intact.  SSE- no VB in vaginal vault. Cervix appears closed  VE 1/80/-3    UA sent   GBS culture sent  Tylenol 975mg PO given for back pain    0940: Patient reports feeling leaking of fluid from 20 minutes ago  White leukherrea noted between legs   SSE- neg pooling, nitrizine equivocal, neg fern  VE 1/80/-3- unchanged  Denies relief of symptoms from tylenol  Appears calm and comfortable  Cat 1 FHT, ctx 2-5 on toco    UA with small ketones  large leuks  WBC 26-50  Bacteria- many

## 2020-10-28 NOTE — OB PROVIDER TRIAGE NOTE - HISTORY OF PRESENT ILLNESS
24yo  @ 35.0 presents with c/o "peeing blood" at 0200 this am and lower abdominal cramping/jesús back pain. Pain comes and goes every 10-15 minutes. Also reports nausea that came on with the pain. Denies vomiting. Denies fever, chills, dysuria, LOF and reports GFM.   Denies exposure to or infection from COVID-19  Gets care with midwife affiliated with another hospital. Patient reports she came here because she is .     Denies PMH/PSH    Ap course complicated by short cervix diagnosed at 33 weeks, no treatment. 24yo  @ 35.0 presents with c/o "peeing blood" at 0200 this am and lower abdominal cramping/jesús back pain. Pain comes and goes every 10-15 minutes. Also reports nausea that came on with the pain. Denies vomiting. Denies fever, chills, dysuria, LOF and reports GFM. Reports she just finished medication for UTI 2 days ago. Unsure of what she took.   Denies exposure to or infection from COVID-19  Gets care with midwife affiliated with another hospital. Patient reports she came here because she is .     Denies PMH/PSH    Ap course complicated by short cervix diagnosed at 33 weeks, no treatment. 24yo  @ 35.0 presents with c/o "peeing blood" at 0200 this am and lower abdominal cramping/jesús back pain. Pain comes and goes every 10-15 minutes. Also reports nausea that came on with the pain. Denies vomiting. Denies fever, chills, dysuria, LOF and reports GFM. Reports she just finished medication for UTI 2 days ago. Unsure of what she took.   Denies exposure to or infection from COVID-19  Gets care with midwife affiliated with another hospital. Patient reports she came here because she is .     Denies PMH/PSH    Ap course complicated by short cervix diagnosed here at 33 weeks, no treatment.   Seen here on 10/17, at that time urine culture with normal urogenital naila.

## 2020-10-28 NOTE — OB PROVIDER TRIAGE NOTE - NSOBPROVIDERNOTE_OBGYN_ALL_OB_FT
D/W Dr. Anne, no evidence of PTL at this time.  Pre-term ctx  Dc'd with Keflex 500mg BID x 7 days  Increase fluid intake  Return for increase in contraction intensity/frequency, leaking of fluid, vaginal bleeding or decrease in fetal movement. D/W Dr. Anne, no evidence of PTL at this time.  Pre-term ctx  Unsure of reaction to PCN, Dc'd with Macrobid 100mg PO BID x 7 days  Increase fluid intake  Return for increase in contraction intensity/frequency, leaking of fluid, vaginal bleeding or decrease in fetal movement.

## 2020-10-28 NOTE — OB PROVIDER TRIAGE NOTE - ADDITIONAL INSTRUCTIONS
Keflex 500mg BID x 7 days  Increase fluid intake  Return for increase in contraction intensity/frequency, leaking of fluid, vaginal bleeding or decrease in fetal movement. Macrobid 100mg PO BID x 7 days  Increase fluid intake  Increase fluid intake  Return for increase in contraction intensity/frequency, leaking of fluid, vaginal bleeding or decrease in fetal movement.

## 2020-10-30 LAB
CULTURE RESULTS: SIGNIFICANT CHANGE UP
SPECIMEN SOURCE: SIGNIFICANT CHANGE UP

## 2020-10-30 NOTE — CHART NOTE - NSCHARTNOTEFT_GEN_A_CORE
1500:  t/c from patient   urine culture never sent on 10/28  pt is going to return to [facility] this afternoon for rpt urine c&S

## 2022-11-16 NOTE — ED PROVIDER NOTE - TOBACCO USE
Never smoker
You can access the FollowMyHealth Patient Portal offered by [facility] by registering at the following website: [email or web address]. By joining [employer]’s FollowMyHealth portal, you will also be able to view your health information using other applications (apps) compatible with our system.

## 2022-12-17 NOTE — OB PROVIDER TRIAGE NOTE - NS_FHOBSERVED_OBGYN_ALL_OB
CONST: no fevers, no chills  EYES: no pain, no vision changes  ENT: no sore throat, no ear pain, no change in hearing  CV: no chest pain, no leg swelling  RESP: no shortness of breath, no cough  ABD: no abdominal pain, no nausea, no vomiting, no diarrhea  : no dysuria, no flank pain, no hematuria  MSK: no back pain, + extremity pain  NEURO: no headache or additional neurologic complaints  HEME: no easy bleeding  SKIN:  no rash Yes

## 2024-05-24 NOTE — ED ADULT NURSE NOTE - NS ED NURSE LEVEL OF CONSCIOUSNESS ORIENTATION
PT denies any self harm or suicidal thoughts to RN at this time.  Pt admits to anxiety.  Pt reports thoughts of self harm to practitioner.      Lara Eckert RN  05/24/24 [id number]     Oriented - self; Oriented - place; Oriented - time